# Patient Record
Sex: MALE | Race: OTHER | HISPANIC OR LATINO
[De-identification: names, ages, dates, MRNs, and addresses within clinical notes are randomized per-mention and may not be internally consistent; named-entity substitution may affect disease eponyms.]

---

## 2019-05-21 PROBLEM — Z00.00 ENCOUNTER FOR PREVENTIVE HEALTH EXAMINATION: Status: ACTIVE | Noted: 2019-05-21

## 2019-06-17 ENCOUNTER — APPOINTMENT (OUTPATIENT)
Dept: HEART AND VASCULAR | Facility: CLINIC | Age: 69
End: 2019-06-17
Payer: MEDICARE

## 2019-06-17 VITALS — HEART RATE: 63 BPM | DIASTOLIC BLOOD PRESSURE: 74 MMHG | SYSTOLIC BLOOD PRESSURE: 120 MMHG

## 2019-06-17 PROCEDURE — 99215 OFFICE O/P EST HI 40 MIN: CPT

## 2019-06-17 NOTE — PHYSICAL EXAM
[General Appearance - Well Developed] : well developed [Normal Appearance] : normal appearance [Well Groomed] : well groomed [General Appearance - Well Nourished] : well nourished [No Deformities] : no deformities [General Appearance - In No Acute Distress] : no acute distress [Heart Sounds] : normal S1 and S2 [Heart Rate And Rhythm] : heart rate and rhythm were normal [Murmurs] : no murmurs present [Respiration, Rhythm And Depth] : normal respiratory rhythm and effort [Auscultation Breath Sounds / Voice Sounds] : lungs were clear to auscultation bilaterally [Exaggerated Use Of Accessory Muscles For Inspiration] : no accessory muscle use [Abdomen Soft] : soft [Abdomen Tenderness] : non-tender [Abdomen Mass (___ Cm)] : no abdominal mass palpated [Cyanosis, Localized] : no localized cyanosis [Nail Clubbing] : no clubbing of the fingernails [Petechial Hemorrhages (___cm)] : no petechial hemorrhages [] : no ischemic changes

## 2019-06-17 NOTE — DISCUSSION/SUMMARY
[FreeTextEntry1] : 68-year-old man with a past medical history of HFrEF (ischemic cardiomyopathy as per report), CAD (s/p PCI LAD in 5/2014 in [place], [place] ), HTN, HLD here for follow up\par \par CAD\par -The patient is asymptomatic with excellent exercise tolerance \par -cont with aspirin, Plavix, atorvastatin and beta-blocker\par -Echo shows unchanged EF in the last 2 years\par -Recommended to report any new onset of chest pain immediately\par \par HFrEF\par -Euvolemic and well compensated\par -EF of 40% on last echo, no need for ICD evaluation yet\par -Repeat echocardiogram in 6 months\par -Continue with ACE inhibitor and beta blocker\par \par HTN\par -Well-controlled today\par -Continue with ACE inhibitor and beta blocker\par \par HLD\par -Recommended obtaining labs from primary MD\par -cont with atorvastatin\par \par f/u in 3 months

## 2019-06-17 NOTE — HISTORY OF PRESENT ILLNESS
[FreeTextEntry1] : REFERRING; PMD: DEVORA Campo\par \par 68-year-old man with a past medical history of HFrEF (ischemic cardiomyopathy), CAD (s/p PCI LAD in 2014 in [place], [place] ), HTN, HLD here for follow up . \par The patient reports feeling fine.He can walk up to one hour without any symptoms.\par Denies chest pain, shortness of breath palpitations, syncope or presyncope.\par \par Reports that his PMD recently checked his cholesterol, and was within normal limits\par \par PMH \par as above\par \par ALL\par NKDA\par \par MEDICATIONS\par Atorvastatin 40 mg daily\par Carvedilol 3.125 mg b.i.d.\par Clopidogrel  75 mg bid\par Lisinopril 10 mg daily\par \par \par Echo 2019: Moderately reduced LV function (EF of 40%)\par Mid apical anterior, apical septum, apical inferior and apical walls are hypokinetic\par Mild MR\par Carotid duplex:  2019: No hemodynamically significant carotid stenosis on either side\par EK2019: Sinus rhythm, or R wave progression (unchanged from before)

## 2019-08-14 ENCOUNTER — RX RENEWAL (OUTPATIENT)
Age: 69
End: 2019-08-14

## 2019-08-14 DIAGNOSIS — I25.10 ATHEROSCLEROTIC HEART DISEASE OF NATIVE CORONARY ARTERY W/OUT ANGINA PECTORIS: ICD-10-CM

## 2019-08-19 ENCOUNTER — APPOINTMENT (OUTPATIENT)
Dept: HEART AND VASCULAR | Facility: CLINIC | Age: 69
End: 2019-08-19

## 2019-09-16 ENCOUNTER — APPOINTMENT (OUTPATIENT)
Dept: HEART AND VASCULAR | Facility: CLINIC | Age: 69
End: 2019-09-16
Payer: MEDICARE

## 2019-09-16 VITALS — SYSTOLIC BLOOD PRESSURE: 120 MMHG | DIASTOLIC BLOOD PRESSURE: 70 MMHG | HEART RATE: 64 BPM

## 2019-09-16 PROCEDURE — 99215 OFFICE O/P EST HI 40 MIN: CPT

## 2019-09-16 NOTE — DISCUSSION/SUMMARY
[FreeTextEntry1] : 68-year-old man with a past medical history of HFrEF (ischemic cardiomyopathy as per report), CAD (s/p PCI LAD in 5/2014 in [place], [place] ), HTN, HLD here for follow up\par \par CAD\par -The patient is asymptomatic with excellent exercise tolerance \par -cont with aspirin, Plavix, atorvastatin and beta-blocker\par -Echo shows unchanged EF in the last 2 years\par -Recommended to report any new onset of chest pain immediately\par -will repeat echo at next visit (12/2019) \par \par HFrEF\par -Euvolemic and well compensated\par -EF of 40% on last echo, no need for ICD evaluation yet\par -Repeat echocardiogram in 12/2019\par -Continue with ACE inhibitor and beta blocker\par -reccomended to weigh daily and report weigh gain > 4 pounds in 2 days\par -recc  to call the office immediately if onset of JOE, orthopnea or LE swelling\par \par HTN\par -Well-controlled today\par -Continue with ACE inhibitor and beta blocker\par \par HLD\par -Recommended obtaining labs from primary MD\par -cont with atorvastatin\par \par PRE OP EVALUATION FOR DAPT\par -the patient had PCI more than 5 years ago\par -clopidogrel can be suspended in the perioperative period \par -in the setting of CAD and PCI, would recommend continuing aspirin in the perioperative period, but if necessary from a surgical standpoint, in the setting of remote history of PCI and lack of symptoms with excellent exercise tolerance, can suspend aspirin and restart as soon as deemed safe from a surgical perspective\par -cont with beta blocker, cont with ACEi perioperatively\par -maintain the patient euvolemic and normotensive in the perioperative period \par \par f/u in 3 months

## 2019-09-16 NOTE — HISTORY OF PRESENT ILLNESS
[FreeTextEntry1] : REFERRING; PMD: DEVORA Campo\par \par 68-year-old man with a past medical history of HFrEF (ischemic cardiomyopathy), CAD (s/p PCI LAD in 2014 in [place], [place] ), HTN, HLD here for follow up . \par The patient reports feeling fine.He can walk up to one hour without any symptoms.\par Denies chest pain, shortness of breath palpitations, syncope or presyncope.\par Reports compliance with his medications. \par The patient has an ? 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 scalp which would require surgical excision and the patient is concerned about stopping DAPT for the procedure\par \par PMH \par as above\par \par ALL\par NKDA\par \par MEDICATIONS\par Atorvastatin 40 mg daily\par Carvedilol 3.125 mg b.i.d.\par Clopidogrel  75 mg daily\par Lisinopril 10 mg daily\par aspirin 81 mg daily \par \par \par Echo 2019: Moderately reduced LV function (EF of 40%)\par Mid apical anterior, apical septum, apical inferior and apical walls are hypokinetic\par Mild MR\par Carotid duplex:  2019: No hemodynamically significant carotid stenosis on either side\par EK2019: Sinus rhythm, poor R wave progression (unchanged from before)\par EKG 2019: Sinus rhythm, poor R wave progression (unchanged)

## 2019-11-22 ENCOUNTER — RX RENEWAL (OUTPATIENT)
Age: 69
End: 2019-11-22

## 2019-12-09 ENCOUNTER — APPOINTMENT (OUTPATIENT)
Dept: HEART AND VASCULAR | Facility: CLINIC | Age: 69
End: 2019-12-09
Payer: MEDICARE

## 2019-12-09 VITALS — DIASTOLIC BLOOD PRESSURE: 66 MMHG | SYSTOLIC BLOOD PRESSURE: 130 MMHG | HEART RATE: 59 BPM

## 2019-12-09 PROCEDURE — 99215 OFFICE O/P EST HI 40 MIN: CPT

## 2019-12-09 NOTE — PHYSICAL EXAM
[General Appearance - Well Developed] : well developed [Normal Appearance] : normal appearance [Well Groomed] : well groomed [General Appearance - In No Acute Distress] : no acute distress [No Deformities] : no deformities [General Appearance - Well Nourished] : well nourished [Murmurs] : no murmurs present [Heart Sounds] : normal S1 and S2 [Heart Rate And Rhythm] : heart rate and rhythm were normal [Exaggerated Use Of Accessory Muscles For Inspiration] : no accessory muscle use [Respiration, Rhythm And Depth] : normal respiratory rhythm and effort [Auscultation Breath Sounds / Voice Sounds] : lungs were clear to auscultation bilaterally [Abdomen Tenderness] : non-tender [Abdomen Soft] : soft [Nail Clubbing] : no clubbing of the fingernails [Abdomen Mass (___ Cm)] : no abdominal mass palpated [Petechial Hemorrhages (___cm)] : no petechial hemorrhages [Cyanosis, Localized] : no localized cyanosis [] : no ischemic changes

## 2019-12-09 NOTE — HISTORY OF PRESENT ILLNESS
[FreeTextEntry1] : REFERRING; PMD: MEGHA Campo\par \par 69-year-old man with a past medical history of HFrEF (ischemic cardiomyopathy), CAD (s/p PCI LAD in 2014 in [place], [place] ), HTN, HLD here for follow up . \par The patient reports that he did not have surgical excision of his ? head angioma and he is still undergoing work up. \par The patient reports feeling fine.He can walk up to one hour without any symptoms.\par Denies chest pain, shortness of breath palpitations, syncope or presyncope.\par Reports compliance with his medications. \par Reports he will travel to [facility] in January\par Asking for med refill today\par \par PMH \par as above\par \par ALL\par NKDA\par \par MEDICATIONS\par Atorvastatin 40 mg daily\par Carvedilol 3.125 mg b.i.d.\par Clopidogrel  75 mg daily\par Lisinopril 10 mg daily\par aspirin 81 mg daily \par \par SH\par no smoke, no etoh, no drugs\par \par \par Echo 2019: Moderately reduced LV function (EF of 40%)\par Mid apical anterior, apical septum, apical inferior and apical walls are hypokinetic\par Mild MR\par Carotid duplex:  2019: No hemodynamically significant carotid stenosis on either side\par EK2019: Sinus rhythm, poor R wave progression (unchanged from before)\par EKG 2019: Sinus rhythm, poor R wave progression (unchanged) \par EKG 2019: SR, poor R wave progression

## 2019-12-09 NOTE — DISCUSSION/SUMMARY
[FreeTextEntry1] : 69-year-old man with a past medical history of HFrEF (ischemic cardiomyopathy as per report), CAD (s/p PCI LAD in 5/2014 in [place], [place] ), HTN, HLD here for follow up\par \par CAD\par -The patient is asymptomatic with excellent exercise tolerance \par -cont with aspirin, Plavix, atorvastatin and beta-blocker (renewed meds today)\par -recc healthy diet and exercise\par -Recommended to report any new onset of chest pain immediately\par -will repeat echo today\par \par HFrEF\par -Euvolemic and well compensated\par -EF of 40% on last echo, no need for ICD evaluation yet\par -Repeat echocardiogram today to exclude progression of cardiomyopathy\par -Continue with ACE inhibitor and beta blocker\par -reccomended to weigh daily and report weigh gain > 4 pounds in 2 days\par -recc  to call the office immediately if onset of SMITH, orthopnea or LE swelling\par \par HTN\par -Well-controlled today\par -Continue with ACE inhibitor and beta blocker\par \par HLD\par -Recommended obtaining labs from primary MD\par -cont with atorvastatin\par \par f/u in 3 months (or as soon as the patient returns from [facility]

## 2021-02-08 ENCOUNTER — APPOINTMENT (OUTPATIENT)
Dept: HEART AND VASCULAR | Facility: CLINIC | Age: 71
End: 2021-02-08
Payer: MEDICARE

## 2021-02-08 VITALS — HEART RATE: 65 BPM | SYSTOLIC BLOOD PRESSURE: 140 MMHG | DIASTOLIC BLOOD PRESSURE: 80 MMHG

## 2021-02-08 PROCEDURE — 99215 OFFICE O/P EST HI 40 MIN: CPT

## 2021-02-08 PROCEDURE — 99072 ADDL SUPL MATRL&STAF TM PHE: CPT

## 2021-02-08 NOTE — DISCUSSION/SUMMARY
[FreeTextEntry1] : 70-year-old man with a past medical history of HFrEF (ischemic cardiomyopathy as per report), CAD (s/p PCI LAD in 5/2014 in [place], [place] ), HTN, HLD here for follow up\par \par CAD\par -The patient is asymptomatic with excellent exercise tolerance \par -cont with aspirin, Plavix, atorvastatin and beta-blocker\par -recc healthy diet and exercise\par -Recommended to report any new onset of chest pain immediately\par -echo in 1/19/2021 overall unchanged \par -will repeat at next visit with contrast in order to better quantify EF\par \par HFrEF\par -Euvolemic and well compensated\par -EF of 40% on last echo, no need for ICD evaluation yet, appears overall unchanged on echo today \par -Repeat echocardiogram today to exclude progression of cardiomyopathyas above with contrast in 3 months \par -Continue with ACE inhibitor and beta blocker\par -reccomended to weigh daily and report weigh gain > 4 pounds in 2 days\par -recc  to call the office immediately if onset of SMITH, orthopnea or LE swelling\par -CMP and lipid panel today\par \par HTN\par -Well-controlled today\par -Continue with ACE inhibitor and beta blocker\par \par HLD\par -Recommended obtaining labs from primary MD\par -cont with atorvastatin\par \par f/u in 3 months or sooner if any symptoms

## 2021-02-08 NOTE — HISTORY OF PRESENT ILLNESS
[FreeTextEntry1] : REFERRING; PMD: Dr. COLVIN, NEY\par \par 70-year-old man with a past medical history of HFrEF (ischemic cardiomyopathy), CAD (s/p PCI LAD in 2014 in [place], [place] ), HTN, HLD here for follow up .  \par The patient reports feeling fine.He can walk up to one hour without any symptoms. Was recently in [facility] and denies chest pain, shortness of breath palpitations, syncope or presyncope.\par Reports compliance with his medications. \par \par PMH \par as above\par \par ALL\par NKDA\par \par MEDICATIONS\par Atorvastatin 40 mg daily\par Carvedilol 3.125 mg b.i.d.\par Clopidogrel  75 mg daily\par Lisinopril 10 mg daily\par aspirin 81 mg daily \par \par SH\par no smoke, no etoh, no drugs\par \par \par Echo 2019: Moderately reduced LV function (EF of 40%)\par Mid apical anterior, apical septum, apical inferior and apical walls are hypokinetic\par Mild MR\par Carotid duplex:  2019: No hemodynamically significant carotid stenosis on either side\par EK2019: Sinus rhythm, poor R wave progression (unchanged from before)\par EKG 2019: Sinus rhythm, poor R wave progression (unchanged) \par EKG 2019: SR, poor R wave progression\par \par EKG 2021\par SR, PRWP \par \par \par Echocardiogram 21\par Left ventricular function appears at least mildly to moderately reduced to limited views available of the endocardium\par The apex appears hypokinetic\par Mild MR\par \par Carotids 21\par mild plaque with no evidence of significant stenosis\par \par EKG 2021\par SR, PRWP \par \par

## 2021-05-10 ENCOUNTER — APPOINTMENT (OUTPATIENT)
Dept: HEART AND VASCULAR | Facility: CLINIC | Age: 71
End: 2021-05-10

## 2021-09-13 ENCOUNTER — APPOINTMENT (OUTPATIENT)
Dept: HEART AND VASCULAR | Facility: CLINIC | Age: 71
End: 2021-09-13
Payer: MEDICARE

## 2021-09-13 VITALS — SYSTOLIC BLOOD PRESSURE: 124 MMHG | HEART RATE: 62 BPM | DIASTOLIC BLOOD PRESSURE: 72 MMHG

## 2021-09-13 PROCEDURE — 99215 OFFICE O/P EST HI 40 MIN: CPT

## 2021-09-13 NOTE — DISCUSSION/SUMMARY
[FreeTextEntry1] : 70-year-old man with a past medical history of HFrEF (ischemic cardiomyopathy as per report), CAD (s/p PCI LAD in 5/2014 in [place], [place] ), HTN, HLD here for follow up\par \par CAD\par -The patient is asymptomatic \par -in the setting of remote h/o PCI with abnormal baseline EKG, will obtain a nuc stress test to r/o ischemia \par -cont with aspirin, Plavix, atorvastatin and beta-blocker\par -recc healthy diet and exercise\par -Recommended to report any new onset of chest pain immediately\par -echo in 1/19/2021 overall unchanged \par \par HFrEF\par -Euvolemic and well compensated\par -EF of 40% on last echo, no need for ICD evaluation yet, appears overall unchanged on echo today \par --nuc stress test as above to assess ischemia and EF\par -Continue with ACE inhibitor and beta blocker\par -reccomended to weigh daily and report weigh gain > 4 pounds in 2 days\par -recc  to call the office immediately if onset of SMITH, orthopnea or LE swelling\par -CMP and lipid panel were drawn  today by his PMD, obtain results\par \par HTN\par -Well-controlled today\par -Continue with ACE inhibitor and beta blocker\par \par HLD\par -Recommended obtaining labs from primary MD\par -cont with atorvastatin\par \par f/u in 2 weeks for nuc stress test results

## 2021-09-13 NOTE — HISTORY OF PRESENT ILLNESS
[FreeTextEntry1] : REFERRING; PMD: NEY Campo\par \par 70-year-old man with a past medical history of HFrEF (ischemic cardiomyopathy), CAD (s/p PCI LAD in 2014 in [place], [place] ), HTN, HLD here for follow up .  \par The patient reports feeling fine.He can walk up to one hour without any symptoms. denies chest pain, shortness of breath palpitations, syncope or presyncope.\par Reports compliance with his medications. \par He will need surgery for a ? angioma of his scalp\par \par \par PMH \par as above\par \par ALL\par NKDA\par \par MEDICATIONS\par Atorvastatin 40 mg daily\par Carvedilol 3.125 mg b.i.d.\par Clopidogrel  75 mg daily\par Lisinopril 10 mg daily\par aspirin 81 mg daily \par \par SH\par no smoke, no etoh, no drugs\par \par \par Echo 2019: Moderately reduced LV function (EF of 40%)\par Mid apical anterior, apical septum, apical inferior and apical walls are hypokinetic\par Mild MR\par Carotid duplex:  2019: No hemodynamically significant carotid stenosis on either side\par EK2019: Sinus rhythm, poor R wave progression (unchanged from before)\par EKG 2019: Sinus rhythm, poor R wave progression (unchanged) \par EKG 2019: SR, poor R wave progression\par \par EKG 2021\par SR, PRWP \par \par \par Echocardiogram 21\par Left ventricular function appears at least mildly to moderately reduced to limited views available of the endocardium\par The apex appears hypokinetic\par Mild MR\par \par Carotids 21\par mild plaque with no evidence of significant stenosis\par \par EKG 2021\par SR, PRWP \par \par EKG 2021 SR, PRWP, TWI V1-V4\par

## 2021-09-27 ENCOUNTER — APPOINTMENT (OUTPATIENT)
Dept: HEART AND VASCULAR | Facility: CLINIC | Age: 71
End: 2021-09-27
Payer: MEDICARE

## 2021-09-27 VITALS — SYSTOLIC BLOOD PRESSURE: 130 MMHG | DIASTOLIC BLOOD PRESSURE: 68 MMHG

## 2021-09-27 PROCEDURE — 99215 OFFICE O/P EST HI 40 MIN: CPT

## 2021-09-27 NOTE — HISTORY OF PRESENT ILLNESS
[FreeTextEntry1] : REFERRING; PMD: MEGHA Campo\par \par 70-year-old man with a past medical history of HFrEF (ischemic cardiomyopathy), CAD (s/p PCI LAD in 2014 in [place], [place] ), HTN, HLD here for follow up and obtain  nuclear stress test results.  \par The patient reports feeling fine.He can walk up to one hour without any symptoms. denies chest pain, shortness of breath palpitations, syncope or presyncope.\par Reports compliance with his medications. \par He will need surgery for a ? angioma of his scalp\par \par \par PMH \par as above\par \par ALL\par NKDA\par \par MEDICATIONS\par Atorvastatin 40 mg daily\par Carvedilol 3.125 mg b.i.d.\par Clopidogrel  75 mg daily\par Lisinopril 10 mg daily\par aspirin 81 mg daily \par \par SH\par no smoke, no etoh, no drugs\par \par \par Echo 2019: Moderately reduced LV function (EF of 40%)\par Mid apical anterior, apical septum, apical inferior and apical walls are hypokinetic\par Mild MR\par Carotid duplex:  2019: No hemodynamically significant carotid stenosis on either side\par EK2019: Sinus rhythm, poor R wave progression (unchanged from before)\par EKG 2019: Sinus rhythm, poor R wave progression (unchanged) \par EKG 2019: SR, poor R wave progression\par \par EKG 2021\par SR, PRWP \par \par \par Echocardiogram 21\par Left ventricular function appears at least mildly to moderately reduced to limited views available of the endocardium\par The apex appears hypokinetic\par Mild MR\par \par Carotids 21\par mild plaque with no evidence of significant stenosis\par \par EKG 2021\par SR, PRWP \par \par EKG 2021 SR, PRWP, TWI V1-V4\par \par Nuclear stress test 2021\par Ischemic stress ECG\par Large severe fixed apical anterior defect\par Moderate size moderately severe reversible septal defects\par Consistent with prior infarction in the distribution of the LAD with mild to moderate manny-infarction ischemia in the same distribution\par LVEF 50%\par

## 2021-09-27 NOTE — DISCUSSION/SUMMARY
[FreeTextEntry1] : 70-year-old man with a past medical history of HFrEF (ischemic cardiomyopathy as per report), CAD (s/p PCI LAD in 5/2014 in [place], [place] ), HTN, HLD here for follow up\par \par CAD\par -The patient is asymptomatic but in view of abn stress test, abnd EKG  h/o PCI and need for pre op clearance, will schedule cardiac cathetization to assesss coronary anatomy \par -cont with aspirin, Plavix, atorvastatin and beta-blocker\par -recc healthy diet and exercise\par -Recommended to report any new onset of chest pain immediately\par -echo in 1/19/2021 overall unchanged \par \par HFrEF\par -Euvolemic and well compensated\par -EF of 40% on last echo,, EF 50% on last nuc stress test \par -Continue with ACE inhibitor and beta blocker\par -reccomended to weigh daily and report weigh gain > 4 pounds in 2 days\par -recc  to call the office immediately if onset of SMITH, orthopnea or LE swelling\par -CMP and lipid panel were drawn recently  by his PMD, obtain results\par \par HTN\par -Well-controlled today\par -Continue with ACE inhibitor and beta blocker\par \par HLD\par -Recommended obtaining labs from primary MD\par -cont with atorvastatin\par \par \par PRE OP EVALUATION PRIOR TO LIPOMA SURGERY\par -The setting of abnormal nuclear stress test will obtain coronary angiogram as above to define coronary anatomy\par -The patient was explained that in case of a PCI surgery will need to be deferred for at least 3 months\par f/u post cath

## 2021-09-29 RX ORDER — CHLORHEXIDINE GLUCONATE 213 G/1000ML
1 SOLUTION TOPICAL ONCE
Refills: 0 | Status: DISCONTINUED | OUTPATIENT
Start: 2021-10-08 | End: 2021-10-22

## 2021-09-29 NOTE — H&P ADULT - HISTORY OF PRESENT ILLNESS
Cardiologist:  COVID:  Pharmacy:  Escort:    SKELETON     71 y/o male with PMHx of CAD (PCI LAD  in [place], [place]) HFrEF, HTN, HLD    who pres/ented to cardiologist Dr. Tejada for routine follow up patient feeling fine. He can walk up to 1 hours without any symptoms. Denies Chest pain, shortness of breath,/ palpitations, syncope or presyncope.  He will need preop clearance for lipoma removal on head.  Echo on 1/19/21  LV function appears atleast mildly to moderately reduced to limited views available, apex hypokinetic, mild MR. NST on 9/23/21 Large severe fixed apical anterior defect, moderately severe reversible defects, consistent with prior infarction in the distribution of the LAD with mild to moderate manny-infarction ischemia in the same distribution, EF 50%. In light of patients risk factors, known Hx of CAD and abnormal NST patient now presents for cardiac catheterization with possible intervention.    Cardiologist:  COVID:  Pharmacy:  Escort:    SKELETON:  LM     71 y/o male with PMHx of CAD (PCI LAD  in [place], [place]) HFrEF, HTN, HLD    who pres/ented to cardiologist Dr. Tejada for routine follow up patient feeling fine. He can walk up to 1 hours without any symptoms. Denies Chest pain, shortness of breath,/ palpitations, syncope or presyncope.  He will need preop clearance for lipoma removal on head.  Echo on 1/19/21  LV function appears atleast mildly to moderately reduced to limited views available, apex hypokinetic, mild MR. NST on 9/23/21 Large severe fixed apical anterior defect, moderately severe reversible defects, consistent with prior infarction in the distribution of the LAD with mild to moderate manny-infarction ischemia in the same distribution, EF 50%. In light of patients risk factors, known Hx of CAD and abnormal NST patient now presents for cardiac catheterization with possible intervention.    Cardiologist: Dr. Tejada   COVID: 10/4 negative in [facility]   Pharmacy:  Escort:      71 y/o male with PMHx of CAD (PCI LAD  in [place], [place]) HFrEF, HTN, HLD    who presented to cardiologist Dr. Tejada for routine follow up patient feeling fine. He can walk up to 1 hours without any symptoms. Denies Chest pain, shortness of breath,/ palpitations, syncope or presyncope.  He will need preop clearance for lipoma removal on head.  Echo on 1/19/21  LV function appears atleast mildly to moderately reduced to limited views available, apex hypokinetic, mild MR. NST on 9/23/21 Large severe fixed apical anterior defect, moderately severe reversible defects, consistent with prior infarction in the distribution of the LAD with mild to moderate manny-infarction ischemia in the same distribution, EF 50%. In light of patients risk factors, known Hx of CAD and abnormal NST patient now presents for cardiac catheterization with possible intervention.    Cardiologist: Dr. Tejada   COVID: 10/4 negative in [facility]   Pharmacy: [facility] [phone]  Escort: Son       69 y/o male with PMHx of CAD (PCI LAD  in [place], [place]) HFrEF, HTN, HLD  who presented to cardiologist Dr. Tejada for routine follow up patient feeling fine. He can walk up to 1 hours without any symptoms. Denies Chest pain, shortness of breath, palpitations, syncope or presyncope.  He will need preop clearance for lipoma removal on head.  Echo on 1/19/21  LV function appears atleast mildly to moderately reduced to limited views available, apex hypokinetic, mild MR. NST on 9/23/21 Large severe fixed apical anterior defect, moderately severe reversible defects, consistent with prior infarction in the distribution of the LAD with mild to moderate manny-infarction ischemia in the same distribution, EF 50%. In light of patients risk factors, known Hx of CAD and abnormal NST patient now presents for cardiac catheterization with possible intervention.

## 2021-09-29 NOTE — H&P ADULT - ASSESSMENT
71 y/o male with PMHx of CAD (PCI proxLAD  in [place], [place]) HFrEF, HTN, HLD  who  In light of patients risk factors, known Hx of CAD and abnormal NST patient now presents for cardiac catheterization with possible intervention.       ASA: II  Mallampati: II     EKG:   Last dose of Aspirin/Plavix daily dosing was yesterday, patient given Aspirin 81mg, Plavix 75mg prior to procedure  Cr: Patient started on NS @ 75 cc/hr     Patient consented using Nepali Language line  solutions #[id number]  Patient is a suitable candidate for moderate sedation  Risks & benefits of procedure and alternative therapy have been explained to the patient including but not limited to: allergic reaction, bleeding w/possible need for blood transfusion, infection, renal and vascular compromise, limb damage, arrhythmia, stroke, vessel dissection/perforation, Myocardial infarction, emergent CABG. Informed consent obtained and in chart.

## 2021-09-29 NOTE — H&P ADULT - NSICDXPASTMEDICALHX_GEN_ALL_CORE_FT
PAST MEDICAL HISTORY:  CAD (coronary artery disease)     Chronic systolic congestive heart failure     Hyperlipidemia     Hypertension

## 2021-10-08 ENCOUNTER — OUTPATIENT (OUTPATIENT)
Dept: OUTPATIENT SERVICES | Facility: HOSPITAL | Age: 71
LOS: 1 days | Discharge: HOME | End: 2021-10-08
Payer: MEDICARE

## 2021-10-08 LAB
A1C WITH ESTIMATED AVERAGE GLUCOSE RESULT: 6 % — HIGH (ref 4–5.6)
ALBUMIN SERPL ELPH-MCNC: 4.6 G/DL — SIGNIFICANT CHANGE UP (ref 3.3–5)
ALP SERPL-CCNC: 54 U/L — SIGNIFICANT CHANGE UP (ref 40–120)
ALT FLD-CCNC: 18 U/L — SIGNIFICANT CHANGE UP (ref 10–45)
ANION GAP SERPL CALC-SCNC: 10 MMOL/L — SIGNIFICANT CHANGE UP (ref 5–17)
APTT BLD: 25.4 SEC — LOW (ref 27.5–35.5)
AST SERPL-CCNC: 23 U/L — SIGNIFICANT CHANGE UP (ref 10–40)
BASOPHILS # BLD AUTO: 0.06 K/UL — SIGNIFICANT CHANGE UP (ref 0–0.2)
BASOPHILS NFR BLD AUTO: 1.2 % — SIGNIFICANT CHANGE UP (ref 0–2)
BILIRUB SERPL-MCNC: 0.5 MG/DL — SIGNIFICANT CHANGE UP (ref 0.2–1.2)
BUN SERPL-MCNC: 15 MG/DL — SIGNIFICANT CHANGE UP (ref 7–23)
CALCIUM SERPL-MCNC: 9.7 MG/DL — SIGNIFICANT CHANGE UP (ref 8.4–10.5)
CHLORIDE SERPL-SCNC: 104 MMOL/L — SIGNIFICANT CHANGE UP (ref 96–108)
CHOLEST SERPL-MCNC: 139 MG/DL — SIGNIFICANT CHANGE UP
CK MB CFR SERPL CALC: 3.1 NG/ML — SIGNIFICANT CHANGE UP (ref 0–6.7)
CK SERPL-CCNC: 136 U/L — SIGNIFICANT CHANGE UP (ref 30–200)
CO2 SERPL-SCNC: 28 MMOL/L — SIGNIFICANT CHANGE UP (ref 22–31)
CREAT SERPL-MCNC: 1.16 MG/DL — SIGNIFICANT CHANGE UP (ref 0.5–1.3)
EOSINOPHIL # BLD AUTO: 0.05 K/UL — SIGNIFICANT CHANGE UP (ref 0–0.5)
EOSINOPHIL NFR BLD AUTO: 1 % — SIGNIFICANT CHANGE UP (ref 0–6)
ESTIMATED AVERAGE GLUCOSE: 126 MG/DL — HIGH (ref 68–114)
GLUCOSE SERPL-MCNC: 131 MG/DL — HIGH (ref 70–99)
HCT VFR BLD CALC: 44.5 % — SIGNIFICANT CHANGE UP (ref 39–50)
HDLC SERPL-MCNC: 45 MG/DL — SIGNIFICANT CHANGE UP
HGB BLD-MCNC: 14.7 G/DL — SIGNIFICANT CHANGE UP (ref 13–17)
IMM GRANULOCYTES NFR BLD AUTO: 0.2 % — SIGNIFICANT CHANGE UP (ref 0–1.5)
INR BLD: 0.94 — SIGNIFICANT CHANGE UP (ref 0.88–1.16)
LIPID PNL WITH DIRECT LDL SERPL: 82 MG/DL — SIGNIFICANT CHANGE UP
LYMPHOCYTES # BLD AUTO: 1.75 K/UL — SIGNIFICANT CHANGE UP (ref 1–3.3)
LYMPHOCYTES # BLD AUTO: 36.4 % — SIGNIFICANT CHANGE UP (ref 13–44)
MCHC RBC-ENTMCNC: 31.4 PG — SIGNIFICANT CHANGE UP (ref 27–34)
MCHC RBC-ENTMCNC: 33 GM/DL — SIGNIFICANT CHANGE UP (ref 32–36)
MCV RBC AUTO: 95.1 FL — SIGNIFICANT CHANGE UP (ref 80–100)
MONOCYTES # BLD AUTO: 0.57 K/UL — SIGNIFICANT CHANGE UP (ref 0–0.9)
MONOCYTES NFR BLD AUTO: 11.9 % — SIGNIFICANT CHANGE UP (ref 2–14)
NEUTROPHILS # BLD AUTO: 2.37 K/UL — SIGNIFICANT CHANGE UP (ref 1.8–7.4)
NEUTROPHILS NFR BLD AUTO: 49.3 % — SIGNIFICANT CHANGE UP (ref 43–77)
NON HDL CHOLESTEROL: 94 MG/DL — SIGNIFICANT CHANGE UP
NRBC # BLD: 0 /100 WBCS — SIGNIFICANT CHANGE UP (ref 0–0)
PLATELET # BLD AUTO: 303 K/UL — SIGNIFICANT CHANGE UP (ref 150–400)
POTASSIUM SERPL-MCNC: 4.5 MMOL/L — SIGNIFICANT CHANGE UP (ref 3.5–5.3)
POTASSIUM SERPL-SCNC: 4.5 MMOL/L — SIGNIFICANT CHANGE UP (ref 3.5–5.3)
PROT SERPL-MCNC: 7.8 G/DL — SIGNIFICANT CHANGE UP (ref 6–8.3)
PROTHROM AB SERPL-ACNC: 11.3 SEC — SIGNIFICANT CHANGE UP (ref 10.6–13.6)
RBC # BLD: 4.68 M/UL — SIGNIFICANT CHANGE UP (ref 4.2–5.8)
RBC # FLD: 12.4 % — SIGNIFICANT CHANGE UP (ref 10.3–14.5)
SODIUM SERPL-SCNC: 142 MMOL/L — SIGNIFICANT CHANGE UP (ref 135–145)
TRIGL SERPL-MCNC: 60 MG/DL — SIGNIFICANT CHANGE UP
WBC # BLD: 4.81 K/UL — SIGNIFICANT CHANGE UP (ref 3.8–10.5)
WBC # FLD AUTO: 4.81 K/UL — SIGNIFICANT CHANGE UP (ref 3.8–10.5)

## 2021-10-08 PROCEDURE — C1769: CPT

## 2021-10-08 PROCEDURE — 83036 HEMOGLOBIN GLYCOSYLATED A1C: CPT

## 2021-10-08 PROCEDURE — 85610 PROTHROMBIN TIME: CPT

## 2021-10-08 PROCEDURE — 82553 CREATINE MB FRACTION: CPT

## 2021-10-08 PROCEDURE — 80061 LIPID PANEL: CPT

## 2021-10-08 PROCEDURE — C1887: CPT

## 2021-10-08 PROCEDURE — 82550 ASSAY OF CK (CPK): CPT

## 2021-10-08 PROCEDURE — 36415 COLL VENOUS BLD VENIPUNCTURE: CPT

## 2021-10-08 PROCEDURE — 80053 COMPREHEN METABOLIC PANEL: CPT

## 2021-10-08 PROCEDURE — 93458 L HRT ARTERY/VENTRICLE ANGIO: CPT

## 2021-10-08 PROCEDURE — 93010 ELECTROCARDIOGRAM REPORT: CPT

## 2021-10-08 PROCEDURE — 99152 MOD SED SAME PHYS/QHP 5/>YRS: CPT

## 2021-10-08 PROCEDURE — 99153 MOD SED SAME PHYS/QHP EA: CPT

## 2021-10-08 PROCEDURE — 85730 THROMBOPLASTIN TIME PARTIAL: CPT

## 2021-10-08 PROCEDURE — 93458 L HRT ARTERY/VENTRICLE ANGIO: CPT | Mod: 26

## 2021-10-08 PROCEDURE — 85025 COMPLETE CBC W/AUTO DIFF WBC: CPT

## 2021-10-08 PROCEDURE — C1894: CPT

## 2021-10-08 PROCEDURE — 93005 ELECTROCARDIOGRAM TRACING: CPT

## 2021-10-08 RX ORDER — LISINOPRIL 2.5 MG/1
1 TABLET ORAL
Qty: 0 | Refills: 0 | DISCHARGE

## 2021-10-08 RX ORDER — ASPIRIN/CALCIUM CARB/MAGNESIUM 324 MG
1 TABLET ORAL
Qty: 0 | Refills: 0 | DISCHARGE

## 2021-10-08 RX ORDER — CLOPIDOGREL BISULFATE 75 MG/1
1 TABLET, FILM COATED ORAL
Qty: 0 | Refills: 0 | DISCHARGE

## 2021-10-08 RX ORDER — ATORVASTATIN CALCIUM 80 MG/1
1 TABLET, FILM COATED ORAL
Qty: 0 | Refills: 0 | DISCHARGE

## 2021-10-08 RX ORDER — CLOPIDOGREL BISULFATE 75 MG/1
75 TABLET, FILM COATED ORAL ONCE
Refills: 0 | Status: COMPLETED | OUTPATIENT
Start: 2021-10-08 | End: 2021-10-08

## 2021-10-08 RX ORDER — ASPIRIN/CALCIUM CARB/MAGNESIUM 324 MG
81 TABLET ORAL ONCE
Refills: 0 | Status: COMPLETED | OUTPATIENT
Start: 2021-10-08 | End: 2021-10-08

## 2021-10-08 RX ORDER — CARVEDILOL PHOSPHATE 80 MG/1
1 CAPSULE, EXTENDED RELEASE ORAL
Qty: 0 | Refills: 0 | DISCHARGE

## 2021-10-08 RX ORDER — SODIUM CHLORIDE 9 MG/ML
500 INJECTION INTRAMUSCULAR; INTRAVENOUS; SUBCUTANEOUS
Refills: 0 | Status: DISCONTINUED | OUTPATIENT
Start: 2021-10-08 | End: 2021-10-22

## 2021-10-08 RX ADMIN — SODIUM CHLORIDE 75 MILLILITER(S): 9 INJECTION INTRAMUSCULAR; INTRAVENOUS; SUBCUTANEOUS at 09:07

## 2021-10-08 RX ADMIN — Medication 81 MILLIGRAM(S): at 09:06

## 2021-10-08 RX ADMIN — CLOPIDOGREL BISULFATE 75 MILLIGRAM(S): 75 TABLET, FILM COATED ORAL at 09:06

## 2021-10-08 NOTE — PROGRESS NOTE ADULT - SUBJECTIVE AND OBJECTIVE BOX
Interventional Cardiology PA SDA Discharge Note    Patient without complaints. Ambulated and voided without difficulties    Afebrile, VSS    Ext:    Right  Radial : no   hematoma,   no  bleeding, dressing; C/D/I      Pulses:    intact RAD/DP/PT to baseline     A/P:  69 y/o male with PMHx of CAD (PCI LAD  in [place], [place]) HFrEF, HTN, HLD  who presented to cardiologist Dr. Tejada for routine follow up patient feeling fine. He can walk up to 1 hours without any symptoms. Denies Chest pain, shortness of breath, palpitations, syncope or presyncope.  He will need preop clearance for lipoma removal on head.  Echo on 1/19/21  LV function appears atleast mildly to moderately reduced to limited views available, apex hypokinetic, mild MR. NST on 9/23/21 Large severe fixed apical anterior defect, moderately severe reversible defects, consistent with prior infarction in the distribution of the LAD with mild to moderate manny-infarction ischemia in the same distribution, EF 50%. In light of patients risk factors, known Hx of CAD and abnormal NST patient now presents for cardiac catheterization with possible intervention. Patient is s/p diagnostic cardiac cath RCA 40%, pLAD 20%, EDP 4, EF 40%.                  1.	Stable for discharge as per attending Dr. Tejada after bed rest, pt voids, wrist stable and 30 minutes of ambulation.  2.	Follow-up with PMD/Cardiologist Dr. Tejada in 1-2 weeks  3.	Discharged forms signed and copies in chart

## 2021-10-13 DIAGNOSIS — I25.118 ATHEROSCLEROTIC HEART DISEASE OF NATIVE CORONARY ARTERY WITH OTHER FORMS OF ANGINA PECTORIS: ICD-10-CM

## 2021-10-13 DIAGNOSIS — R94.39 ABNORMAL RESULT OF OTHER CARDIOVASCULAR FUNCTION STUDY: ICD-10-CM

## 2021-10-15 PROBLEM — E78.5 HYPERLIPIDEMIA, UNSPECIFIED: Chronic | Status: ACTIVE | Noted: 2021-09-29

## 2021-10-15 PROBLEM — I10 ESSENTIAL (PRIMARY) HYPERTENSION: Chronic | Status: ACTIVE | Noted: 2021-09-29

## 2021-10-15 PROBLEM — I50.22 CHRONIC SYSTOLIC (CONGESTIVE) HEART FAILURE: Chronic | Status: ACTIVE | Noted: 2021-09-29

## 2021-10-15 PROBLEM — I25.10 ATHEROSCLEROTIC HEART DISEASE OF NATIVE CORONARY ARTERY WITHOUT ANGINA PECTORIS: Chronic | Status: ACTIVE | Noted: 2021-09-29

## 2021-10-18 ENCOUNTER — APPOINTMENT (OUTPATIENT)
Dept: HEART AND VASCULAR | Facility: CLINIC | Age: 71
End: 2021-10-18
Payer: MEDICARE

## 2021-10-18 VITALS — SYSTOLIC BLOOD PRESSURE: 120 MMHG | HEART RATE: 64 BPM | DIASTOLIC BLOOD PRESSURE: 64 MMHG

## 2021-10-18 PROCEDURE — 99215 OFFICE O/P EST HI 40 MIN: CPT

## 2021-10-18 NOTE — HISTORY OF PRESENT ILLNESS
[FreeTextEntry1] : REFERRING; PMD: MEGHA Campo\par \par 70-year-old man with a past medical history of HFrEF (ischemic cardiomyopathy), CAD (s/p PCI LAD in 2014 in [place], [place] ), HTN, HLD here for follow up after recent cardiac cath which revealed nonobstructive CAD\par The patient reports feeling fine.He can walk up to one hour without any symptoms. denies chest pain, shortness of breath palpitations, syncope or presyncope.\par Reports compliance with his medications. \par He will likely need surgery for a ? angioma of his scalp\par \par \par PMH \par as above\par \par ALL\par NKDA\par \par MEDICATIONS\par Atorvastatin 40 mg daily\par Carvedilol 3.125 mg b.i.d.\par Clopidogrel  75 mg daily\par Lisinopril 10 mg daily\par aspirin 81 mg daily \par \par SH\par no smoke, no etoh, no drugs\par \par \par Echo 2019: Moderately reduced LV function (EF of 40%)\par Mid apical anterior, apical septum, apical inferior and apical walls are hypokinetic\par Mild MR\par Carotid duplex:  2019: No hemodynamically significant carotid stenosis on either side\par EK2019: Sinus rhythm, poor R wave progression (unchanged from before)\par EKG 2019: Sinus rhythm, poor R wave progression (unchanged) \par EKG 2019: SR, poor R wave progression\par \par EKG 2021\par SR, PRWP \par \par \par Echocardiogram 21\par Left ventricular function appears at least mildly to moderately reduced to limited views available of the endocardium\par The apex appears hypokinetic\par Mild MR\par \par Carotids 21\par mild plaque with no evidence of significant stenosis\par \par EKG 2021\par SR, PRWP \par \par EKG 2021 SR, PRWP, TWI V1-V4\par \par Nuclear stress test 2021\par Ischemic stress ECG\par Large severe fixed apical anterior defect\par Moderate size moderately severe reversible septal defects\par Consistent with prior infarction in the distribution of the LAD with mild to moderate manny-infarction ischemia in the same distribution\par LVEF 50%\par \par Cardiac catheterization 10/8/2021\par Left main: Normal\par LAD 20% proximal\par Circumflex minor irregularities\par RCA: Dominant, 40%\par \par LVEF 40% LVEDP 4\par \par

## 2021-10-18 NOTE — DISCUSSION/SUMMARY
[FreeTextEntry1] : 70-year-old man with a past medical history of HFrEF (ischemic cardiomyopathy as per report), CAD (s/p PCI LAD in 5/2014 in [place], [place] ), HTN, HLD here for follow up\par \par CAD\par -The patient is asymptomatic \par -Cardiac catheterization 10/8/2021 revealed nonobstructive CAD\par -cont with aspirin, Plavix, atorvastatin and beta-blocker\par -recc healthy diet and exercise\par -Recommended to report any new onset of chest pain immediately\par -echo in 1/19/2021 overall unchanged \par \par HFrEF\par -Euvolemic and well compensated\par -EF of 40% on last echo,, EF 50% on last nuc stress test \par -Continue with ACE inhibitor and beta blocker\par -reccomended to weigh daily and report weigh gain > 4 pounds in 2 days\par -recc  to call the office immediately if onset of SMITH, orthopnea or LE swelling\par \par \par HTN\par -Well-controlled today\par -Continue with ACE inhibitor and beta blocker\par -Follow-up for CMP as per PMD\par \par HLD\par -ldl 82 on 10/8/2021\par -ldl goal is < 70 \par -recc to continue with healthy diet and exercise\par -cont with atorvastatin\par \par \par PRE OP EVALUATION PRIOR TO LIPOMA SURGERY\par -In the setting of excellent exercise tolerance, euvolemia, and nonobstructive coronary artery disease on recent cath the patient can proceed with lipoma removal surgery without the need of any additional cardiology work-up.\par Recommend continue with her current medications including beta-blocker\par -Recommend to maintain the patient euvolemic and normotensive perioperatively\par \par f/u in 4 months or sooner if any symptoms

## 2022-01-24 ENCOUNTER — APPOINTMENT (OUTPATIENT)
Dept: HEART AND VASCULAR | Facility: CLINIC | Age: 72
End: 2022-01-24
Payer: MEDICARE

## 2022-01-24 VITALS — DIASTOLIC BLOOD PRESSURE: 70 MMHG | SYSTOLIC BLOOD PRESSURE: 140 MMHG | HEART RATE: 82 BPM

## 2022-01-24 PROCEDURE — 99214 OFFICE O/P EST MOD 30 MIN: CPT

## 2022-01-24 RX ORDER — CLOPIDOGREL BISULFATE 75 MG/1
75 TABLET, FILM COATED ORAL DAILY
Qty: 30 | Refills: 1 | Status: COMPLETED | COMMUNITY
Start: 2019-08-14 | End: 2022-01-24

## 2022-01-24 RX ORDER — LISINOPRIL 10 MG/1
10 TABLET ORAL
Qty: 90 | Refills: 1 | Status: ACTIVE | COMMUNITY
Start: 2019-12-09 | End: 1900-01-01

## 2022-01-24 RX ORDER — CARVEDILOL 3.12 MG/1
3.12 TABLET, FILM COATED ORAL TWICE DAILY
Qty: 180 | Refills: 1 | Status: ACTIVE | COMMUNITY
Start: 2019-12-09 | End: 1900-01-01

## 2022-01-24 RX ORDER — CLOPIDOGREL BISULFATE 75 MG/1
75 TABLET, FILM COATED ORAL DAILY
Qty: 90 | Refills: 1 | Status: ACTIVE | COMMUNITY
Start: 2019-12-09 | End: 1900-01-01

## 2022-01-24 RX ORDER — ATORVASTATIN CALCIUM 40 MG/1
40 TABLET, FILM COATED ORAL DAILY
Qty: 30 | Refills: 1 | Status: COMPLETED | COMMUNITY
Start: 2019-08-14 | End: 2022-01-24

## 2022-01-24 RX ORDER — ASPIRIN ENTERIC COATED TABLETS 81 MG 81 MG/1
81 TABLET, DELAYED RELEASE ORAL
Qty: 90 | Refills: 1 | Status: ACTIVE | COMMUNITY
Start: 2019-12-09 | End: 1900-01-01

## 2022-01-24 RX ORDER — ATORVASTATIN CALCIUM 40 MG/1
40 TABLET, FILM COATED ORAL DAILY
Qty: 90 | Refills: 1 | Status: ACTIVE | COMMUNITY
Start: 2019-12-09 | End: 1900-01-01

## 2022-01-24 NOTE — DISCUSSION/SUMMARY
[FreeTextEntry1] : 70-year-old man with a past medical history of HFrEF (ischemic cardiomyopathy as per report), CAD (s/p PCI LAD in 5/2014 in [place], [place] ), HTN, HLD here for follow up\par \par CAD\par -The patient is asymptomatic with excellent exercise tolerance\par -Cardiac catheterization 10/8/2021 revealed nonobstructive CAD\par -cont with aspirin, Plavix, atorvastatin and beta-blocker\par -recc healthy diet and exercise\par -Recommended to report any new onset of chest pain immediately\par -echo in 1/19/2021 overall unchanged \par \par HFrEF\par -Euvolemic and well compensated\par -EF of 40% on last EF 50% on last nuc stress test \par -Continue with ACE inhibitor and beta blocker\par -recommended to weigh daily and report weigh gain > 4 pounds in 2 days\par -recc  to call the office immediately if onset of SMITH, orthopnea or LE swelling\par \par \par HTN\par -Well-controlled today\par -Continue with ACE inhibitor and beta blocker\par -Follow-up for CMP as per PMD\par \par HLD\par -ldl 82 on 10/8/2021\par -ldl goal is < 70 \par -recc to continue with healthy diet and exercise\par -cont with atorvastatin\par \par \par f/u in 6 months or sooner if any symptoms \par medications renewed today

## 2022-01-24 NOTE — HISTORY OF PRESENT ILLNESS
[FreeTextEntry1] : REFERRING; PMD: MEGHA Campo\par \par 70-year-old man with a past medical history of HFrEF (ischemic cardiomyopathy), CAD (s/p PCI LAD in 2014 in [place], [place] ), HTN, HLD here for follow up \par the patient had  cardiac cath 10/2021  which revealed nonobstructive CAD\par The patient reports feeling fine.He can walk up to one hour without any symptoms. denies chest pain, shortness of breath palpitations, syncope or presyncope.\par Reports compliance with his medications. \par \par \par PMH \par as above\par \par ALL\par NKDA\par \par MEDICATIONS\par Atorvastatin 40 mg daily\par Carvedilol 3.125 mg b.i.d.\par Clopidogrel  75 mg daily\par Lisinopril 10 mg daily\par aspirin 81 mg daily \par \par SH\par no smoke, no etoh, no drugs\par \par \par Echo 2019: Moderately reduced LV function (EF of 40%)\par Mid apical anterior, apical septum, apical inferior and apical walls are hypokinetic\par Mild MR\par Carotid duplex:  2019: No hemodynamically significant carotid stenosis on either side\par EK2019: Sinus rhythm, poor R wave progression (unchanged from before)\par EKG 2019: Sinus rhythm, poor R wave progression (unchanged) \par EKG 2019: SR, poor R wave progression\par \par EKG 2021\par SR, PRWP \par \par \par Echocardiogram 21\par Left ventricular function appears at least mildly to moderately reduced to limited views available of the endocardium\par The apex appears hypokinetic\par Mild MR\par \par Carotids 21\par mild plaque with no evidence of significant stenosis\par \par EKG 2021\par SR, PRWP \par \par EKG 2021 SR, PRWP, TWI V1-V4\par \par Nuclear stress test 2021\par Ischemic stress ECG\par Large severe fixed apical anterior defect\par Moderate size moderately severe reversible septal defects\par Consistent with prior infarction in the distribution of the LAD with mild to moderate manny-infarction ischemia in the same distribution\par LVEF 50%\par \par Cardiac catheterization 10/8/2021\par Left main: Normal\par LAD 20% proximal\par Circumflex minor irregularities\par RCA: Dominant, 40%\par \par LVEF 40% LVEDP 4\par \par

## 2022-07-27 ENCOUNTER — APPOINTMENT (OUTPATIENT)
Dept: HEART AND VASCULAR | Facility: CLINIC | Age: 72
End: 2022-07-27

## 2022-07-27 PROCEDURE — XXXXX: CPT

## 2024-11-26 NOTE — H&P ADULT - NS NEC GEN PE MLT EXAM PC
PRE-OP DIAGNOSIS:  Complication of repair of pelvic floor disorder 26-Nov-2024 17:10:19  Gasper Wall  
No bruits; no thyromegaly or nodules

## 2025-02-09 NOTE — H&P ADULT - CARDIOVASCULAR DETAILS
Problem: Breathing Pattern Ineffective  Goal: Air exchange is effective, demonstrated by Sp02 sat of greater then or = 92% (or as ordered)  Outcome: Monitoring/Evaluating progress  Goal: Respiratory pattern is quiet and regular without report of SOB  Outcome: Monitoring/Evaluating progress  Goal: Breathing pattern demonstrates minimal apnea during sleep with appropriate use of airway pressure support devices  Outcome: Monitoring/Evaluating progress  Goal: Verbalizes/demonstrates effective breathing management strategies  Description: Document education using the patient education activity.   Outcome: Monitoring/Evaluating progress     Problem: Pneumonia  Goal: S/S of acute pneumonia are resolved  Description: If acute pneumonia is present, monitor for resolution of fever, cough, secretions and other test values based on presentation.  Outcome: Monitoring/Evaluating progress  Goal: Verbalizes understanding of pneumonia, treatment, and ongoing prevention  Description: Document on Patient Education Activity  Outcome: Monitoring/Evaluating progress     Problem: Pain  Goal: Acceptable pain level achieved/maintained at rest using appropriate pain scale for the patient  Outcome: Monitoring/Evaluating progress  Goal: Acceptable pain level achieved/maintained with activity using appropriate pain scale for the patient  Outcome: Monitoring/Evaluating progress  Goal: Acceptable pain level achieved/maintained without oversedation  Outcome: Monitoring/Evaluating progress     Problem: At Risk for Falls  Goal: Patient does not fall  Outcome: Monitoring/Evaluating progress  Goal: Patient takes action to control fall-related risks  Outcome: Monitoring/Evaluating progress     Problem: At Risk for Injury Due to Fall  Goal: Patient does not fall  Outcome: Monitoring/Evaluating progress  Goal: Takes action to control condition specific risks  Outcome: Monitoring/Evaluating progress  Goal: Verbalizes understanding of fall-related injury  personal risks  Description: Document education using the patient education activity  Outcome: Monitoring/Evaluating progress      positive S1/positive S2